# Patient Record
Sex: FEMALE
[De-identification: names, ages, dates, MRNs, and addresses within clinical notes are randomized per-mention and may not be internally consistent; named-entity substitution may affect disease eponyms.]

---

## 2021-06-14 ENCOUNTER — NURSE TRIAGE (OUTPATIENT)
Dept: OTHER | Facility: CLINIC | Age: 31
End: 2021-06-14

## 2021-06-14 NOTE — TELEPHONE ENCOUNTER
Brief description of triage: Pt with intermittent headache for about 2 weeks. unsure if it is sinus related or stress related. Pain today is 7/10. Has taken tylenol cold and sinus. No other symptoms. Triage indicates for patient to see today or tomorrow. Care advice provided, patient verbalizes understanding; denies any other questions or concerns; instructed to call back for any new or worsening symptoms. This triage is a result of a call to [address] [place] [address]. Please do not respond to the triage nurse through this encounter. Any subsequent communication should be directly with the patient. Reason for Disposition   Unexplained headache that is present > 24 hours    Answer Assessment - Initial Assessment Questions  1. LOCATION: \"Where does it hurt? \"       Headache located between nose and across head    2. ONSET: \"When did the headache start? \" (Minutes, hours or days)      Started about 2 weeks ago  Today took tylenol cold and sinus    3. PATTERN: \"Does the pain come and go, or has it been constant since it started? \"    Headache has been off and on    4. SEVERITY: \"How bad is the pain? \" and \"What does it keep you from doing? \"  (e.g., Scale 1-10; mild, moderate, or severe)    - MILD (1-3): doesn't interfere with normal activities     - MODERATE (4-7): interferes with normal activities or awakens from sleep     - SEVERE (8-10): excruciating pain, unable to do any normal activities        Pain is currently at a 7/10    5. RECURRENT SYMPTOM: \"Have you ever had headaches before? \" If so, ask: \"When was the last time? \" and \"What happened that time?\"        6. CAUSE: \"What do you think is causing the headache? \"    Unsure if related to sinus or stress    7. MIGRAINE: \"Have you been diagnosed with migraine headaches? \" If so, ask: \"Is this headache similar?\"        8. HEAD INJURY: \"Has there been any recent injury to the head?\"      9. OTHER SYMPTOMS: \"Do you have any other symptoms? \" (fever, stiff neck, eye pain, sore throat, cold symptoms)    No congestion or runny nose. No fever    10. PREGNANCY: \"Is there any chance you are pregnant? \" \"When was your last menstrual period? \"        Took IUD out last month  Not pregnant    Protocols used: HEADACHE-ADULT-[place]

## 2021-06-26 ENCOUNTER — NURSE TRIAGE (OUTPATIENT)
Dept: OTHER | Facility: CLINIC | Age: 31
End: 2021-06-26

## 2021-06-26 NOTE — TELEPHONE ENCOUNTER
Brief description of triage: patient had her IUD removed last month and is now experiencing breast tenderness. See her triage below. Triage indicates for patient to be seen by her PCP in the next 2 weeks. Care advice provided, patient verbalizes understanding; denies any other questions or concerns; instructed to call back for any new or worsening symptoms. This triage is a result of a call to [address] [place] [address]. Please do not respond to the triage nurse through this encounter. Any subsequent communication should be directly with the patient. Reason for Disposition   [1] Breast pain AND [2] cause is not known    Answer Assessment - Initial Assessment Questions  1. SYMPTOM: \"What's the main symptom you're concerned about? \"  (e.g., lump, pain, rash, nipple discharge)      Right side painful. 2. LOCATION: \"Where is the pain located? \"      Right side. 3. ONSET: \"When did pain start? \"      2 weeks. 4. PRIOR HISTORY: \"Do you have any history of prior problems with your breasts? \" (e.g., lumps, cancer, fibrocystic breast disease)      No     5. CAUSE: \"What do you think is causing this symptom? \"     Unsure. 6. OTHER SYMPTOMS: \"Do you have any other symptoms? \" (e.g., fever, breast pain, redness or rash, nipple discharge)      Breast pain. 7. PREGNANCY-BREASTFEEDING: \"Is there any chance you are pregnant? \" \"When was your last menstrual period? \" \"Are you breastfeeding? \"      No breastfeeding. No pregnancy. LMP June 7-11th.     Protocols used: [facility]

## 2021-09-01 ENCOUNTER — NURSE TRIAGE (OUTPATIENT)
Dept: OTHER | Facility: CLINIC | Age: 31
End: 2021-09-01

## 2021-09-01 NOTE — TELEPHONE ENCOUNTER
Brief description of triage: Patient is calling concerned about low body temperature . Onset of symptoms was 0545 this morning . Denies any other symptoms. Patient stated she checked her temp by placing head on wall thermometer at the gym. Had patient recheck temp using home thermometer and the reading was 98.1. No further questions or concerns. Please review assessment below for more details. Triage indicates for patient to monitor at home. Care advice provided, patient verbalizes understanding; denies any other questions or concerns; instructed to call back for any new or worsening symptoms. This triage is a result of a call to [facility]. Please do not respond to the triage nurse through this encounter. Any subsequent communication should be directly with the patient. Reason for Disposition   Hypothermia prevention and cold exposure, questions about    Answer Assessment - Initial Assessment Questions  1. SYMPTOMS: \"What symptoms are you concerned about? \"      - Patient was at the gym and checked her temperature with a wall thermometer and the reading was 94 degrees pre-workout and 96 degrees post workout and she is concerned. 2. ONSET:  \"When did the symptoms start? \"      - First temp was taken at 0545 this morning and second temp was taken at 0630.   3. TEMPERATURE: \"What is the temperature? \" \"How was it measured? \"       - 9912 temp was 94 degrees and the 0630 temp was 96 degrees. Both taken at the gym using a thermometer on the wall that you place your head up to.     4. COLD EXPOSURE: \"Was there an exposure to cold temperatures? \" (e.g., outside in the snow, swimming in cold water, air conditioning)      - No cold exposure. 5. OTHER SYMPTOMS: \"Are there any other symptoms? \" (e.g., fever, weakness, confusion, numbness of fingers or toes)      - Patient is feeling tired but just completed a workout. Denies any other symptoms. 6.  PREGNANCY: \"Is there any chance you are pregnant? \" \"When was your last menstrual period? \"      - Patient is not pregnant as she is on her menstrual period. Patient was home so this writer had patient take her temp using home thermometer and the reading was 98. 1.     Protocols used: COLD EXPOSURE (HYPOTHERMIA)-ADULT-